# Patient Record
Sex: FEMALE | Race: WHITE
[De-identification: names, ages, dates, MRNs, and addresses within clinical notes are randomized per-mention and may not be internally consistent; named-entity substitution may affect disease eponyms.]

---
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
## 2020-10-13 ENCOUNTER — HOSPITAL ENCOUNTER (OUTPATIENT)
Dept: HOSPITAL 47 - RADUSWWP | Age: 20
Discharge: HOME | End: 2020-10-13
Attending: OBSTETRICS & GYNECOLOGY
Payer: COMMERCIAL

## 2020-10-13 DIAGNOSIS — R10.2: Primary | ICD-10-CM

## 2020-10-13 PROCEDURE — 76830 TRANSVAGINAL US NON-OB: CPT

## 2020-10-13 NOTE — US
EXAMINATION TYPE: US pelvic complete

 

DATE OF EXAM: 10/13/2020

 

COMPARISON: NONE

 

CLINICAL HISTORY: R10.2 PELVIC PAIN. Pain had a dermoid cyst on the right removed in 2018.

 

TECHNIQUE:  Transvaginal (TV).  Transvaginal sonographic images were medically necessary to better as
sess the following anatomy: Uterus and ovaries.

 

Date of LMP:  09/28/2020

 

EXAM MEASUREMENTS:

 

Uterus:  6.9 x 3.7 x 4.8  cm

Endometrial Stripe: .6 cm

Right Ovary:  3.1 x 2.3 x 3.2  cm

 

 

 

 

1. Uterus:  Anteverted   wnl

2. Endometrium:  wnl

3. Right Ovary:  Cystic area seen 1.2 x 1.0 x 1.1cm. 

4. Left Ovary:  Obscured by overlying bowel gas

5. Bilateral Adnexa:  wnl

6. Posterior cul-de-sac:  wnl

 

 

 

IMPRESSION:

Probable functional right ovarian cyst. Otherwise unremarkable study.